# Patient Record
Sex: FEMALE | Race: BLACK OR AFRICAN AMERICAN | NOT HISPANIC OR LATINO | URBAN - METROPOLITAN AREA
[De-identification: names, ages, dates, MRNs, and addresses within clinical notes are randomized per-mention and may not be internally consistent; named-entity substitution may affect disease eponyms.]

---

## 2019-10-30 ENCOUNTER — APPOINTMENT (RX ONLY)
Dept: URBAN - METROPOLITAN AREA CLINIC 81 | Facility: CLINIC | Age: 64
Setting detail: DERMATOLOGY
End: 2019-10-30

## 2019-10-30 DIAGNOSIS — I73.00 RAYNAUD'S SYNDROME WITHOUT GANGRENE: ICD-10-CM

## 2019-10-30 DIAGNOSIS — D485 NEOPLASM OF UNCERTAIN BEHAVIOR OF SKIN: ICD-10-CM

## 2019-10-30 DIAGNOSIS — L93.0 DISCOID LUPUS ERYTHEMATOSUS: ICD-10-CM

## 2019-10-30 DIAGNOSIS — D49.2 NEOPLASM OF UNSPECIFIED BEHAVIOR OF BONE, SOFT TISSUE, AND SKIN: ICD-10-CM

## 2019-10-30 PROBLEM — D48.5 NEOPLASM OF UNCERTAIN BEHAVIOR OF SKIN: Status: ACTIVE | Noted: 2019-10-30

## 2019-10-30 PROBLEM — I48.91 UNSPECIFIED ATRIAL FIBRILLATION: Status: ACTIVE | Noted: 2019-10-30

## 2019-10-30 PROCEDURE — ? DEFER

## 2019-10-30 PROCEDURE — ? COUNSELING

## 2019-10-30 PROCEDURE — ? TREATMENT REGIMEN

## 2019-10-30 PROCEDURE — 99202 OFFICE O/P NEW SF 15 MIN: CPT

## 2019-10-30 ASSESSMENT — LOCATION DETAILED DESCRIPTION DERM
LOCATION DETAILED: RIGHT PROXIMAL DORSAL RING FINGER
LOCATION DETAILED: LEFT DISTAL PALMAR MIDDLE FINGER
LOCATION DETAILED: RIGHT DISTAL PALMAR INDEX FINGER
LOCATION DETAILED: RIGHT DISTAL PALMAR MIDDLE FINGER
LOCATION DETAILED: LEFT ANTERIOR SHOULDER
LOCATION DETAILED: RIGHT INFERIOR LATERAL FOREHEAD
LOCATION DETAILED: RIGHT DISTAL VENTRAL THUMB
LOCATION DETAILED: RIGHT MID-UPPER BACK

## 2019-10-30 ASSESSMENT — LOCATION SIMPLE DESCRIPTION DERM
LOCATION SIMPLE: RIGHT UPPER BACK
LOCATION SIMPLE: RIGHT MIDDLE FINGER
LOCATION SIMPLE: RIGHT FOREHEAD
LOCATION SIMPLE: RIGHT INDEX FINGER
LOCATION SIMPLE: RIGHT RING FINGER
LOCATION SIMPLE: RIGHT THUMB
LOCATION SIMPLE: LEFT SHOULDER
LOCATION SIMPLE: LEFT MIDDLE FINGER

## 2019-10-30 ASSESSMENT — LOCATION ZONE DERM
LOCATION ZONE: FACE
LOCATION ZONE: FINGER
LOCATION ZONE: ARM
LOCATION ZONE: TRUNK

## 2019-10-30 NOTE — PROCEDURE: TREATMENT REGIMEN
Plan: Lab slip for CBC, ESR, LUIS DANIEL, RF, UA, BUN, CREAT and VIT D given to pt today- depending on lab results and punch biopsy results will treat accordingly \\nPt to return to clinic after vacation for bx on the scalp\\n[facility]
Detail Level: Zone

## 2019-10-30 NOTE — PROCEDURE: MIPS QUALITY
Detail Level: Detailed
Quality 110: Preventive Care And Screening: Influenza Immunization: Influenza Immunization Administered during Influenza season
Quality 111:Pneumonia Vaccination Status For Older Adults: Pneumococcal Vaccination Previously Received
Quality 226: Preventive Care And Screening: Tobacco Use: Screening And Cessation Intervention: Patient screened for tobacco use and is an ex/non-smoker
Quality 130: Documentation Of Current Medications In The Medical Record: Current Medications Documented
Quality 131: Pain Assessment And Follow-Up: Pain assessment using a standardized tool is documented as negative, no follow-up plan required

## 2019-10-30 NOTE — PROCEDURE: DEFER
Detail Level: Detailed
Procedure To Be Performed At Next Visit: Punch Excision
Introduction Text (Please End With A Colon): lesions are not causing any symptoms

## 2020-12-08 ENCOUNTER — APPOINTMENT (RX ONLY)
Dept: URBAN - METROPOLITAN AREA CLINIC 81 | Facility: CLINIC | Age: 65
Setting detail: DERMATOLOGY
End: 2020-12-08

## 2020-12-08 DIAGNOSIS — D485 NEOPLASM OF UNCERTAIN BEHAVIOR OF SKIN: ICD-10-CM

## 2020-12-08 DIAGNOSIS — L93.0 DISCOID LUPUS ERYTHEMATOSUS: ICD-10-CM | Status: INADEQUATELY CONTROLLED

## 2020-12-08 PROBLEM — D48.5 NEOPLASM OF UNCERTAIN BEHAVIOR OF SKIN: Status: ACTIVE | Noted: 2020-12-08

## 2020-12-08 PROCEDURE — ? ORDER TESTS

## 2020-12-08 PROCEDURE — 11104 PUNCH BX SKIN SINGLE LESION: CPT

## 2020-12-08 PROCEDURE — ? TREATMENT REGIMEN

## 2020-12-08 PROCEDURE — 99213 OFFICE O/P EST LOW 20 MIN: CPT | Mod: 25

## 2020-12-08 PROCEDURE — ? BIOPSY BY PUNCH METHOD

## 2020-12-08 PROCEDURE — ? COUNSELING

## 2020-12-08 PROCEDURE — ? PRESCRIPTION

## 2020-12-08 RX ORDER — BETAMETHASONE DIPROPIONATE 0.5 MG/G
CREAM, AUGMENTED TOPICAL
Qty: 1 | Refills: 1 | Status: ERX

## 2020-12-08 RX ORDER — MOMETASONE FUROATE 1 MG/G
CREAM TOPICAL
Qty: 1 | Refills: 1 | Status: ERX

## 2020-12-08 ASSESSMENT — LOCATION DETAILED DESCRIPTION DERM
LOCATION DETAILED: LEFT INFERIOR PARIETAL SCALP
LOCATION DETAILED: LEFT INFERIOR FRONTAL SCALP
LOCATION DETAILED: RIGHT INFERIOR FRONTAL SCALP
LOCATION DETAILED: RIGHT SUPERIOR MEDIAL UPPER BACK

## 2020-12-08 ASSESSMENT — LOCATION SIMPLE DESCRIPTION DERM
LOCATION SIMPLE: SCALP
LOCATION SIMPLE: RIGHT UPPER BACK

## 2020-12-08 ASSESSMENT — LOCATION ZONE DERM
LOCATION ZONE: SCALP
LOCATION ZONE: TRUNK

## 2020-12-08 ASSESSMENT — SEVERITY ASSESSMENT: SEVERITY: MODERATE

## 2020-12-08 NOTE — PROCEDURE: ORDER TESTS
Billing Type: Client Bill
Bill For Surgical Tray: no
Performing Laboratory: [id number]
Expected Date Of Service: 12/08/2020

## 2020-12-08 NOTE — PROCEDURE: BIOPSY BY PUNCH METHOD
Detail Level: Detailed
Was A Bandage Applied: Yes
Punch Size In Mm: 4
Biopsy Type: H and E
Anesthesia Type: 1% lidocaine with epinephrine
Anesthesia Volume In Cc (Will Not Render If 0): 1
Additional Anesthesia Volume In Cc (Will Not Render If 0): 0
Hemostasis: None
Epidermal Sutures: 4-0 Prolene
Number Of Epidermal Sutures (Optional): 2
Wound Care: Aquaphor
Dressing: bandage
Suture Removal: 7 days
Patient Will Remove Sutures At Home?: No
Lab: [id number]
Lab Facility: [id number]
Consent: Written consent was obtained and risks were reviewed including but not limited to scarring, infection, bleeding, scabbing, incomplete removal, nerve damage and allergy to anesthesia.
Post-Care Instructions: I reviewed with the patient in detail post-care instructions. Patient is to keep the biopsy site dry overnight, and then apply bacitracin twice daily until healed. Patient may apply hydrogen peroxide soaks to remove any crusting.
Home Suture Removal Text: Patient was provided a home suture removal kit and will remove their sutures at home.  If they have any questions or difficulties they will call the office.
Notification Instructions: Patient will be notified of biopsy results. However, patient instructed to call the office if not contacted within 2 weeks.
Billing Type: Third-Party Bill
Information: Selecting Yes will display possible errors in your note based on the variables you have selected. This validation is only offered as a suggestion for you. PLEASE NOTE THAT THE VALIDATION TEXT WILL BE REMOVED WHEN YOU FINALIZE YOUR NOTE. IF YOU WANT TO FAX A PRELIMINARY NOTE YOU WILL NEED TO TOGGLE THIS TO 'NO' IF YOU DO NOT WANT IT IN YOUR FAXED NOTE.
Biopsy Type: DIF
Lab: [id number]
Lab Facility: [id number]
Home Suture Removal Text: Patient was provided a home suture removal kit and will remove their sutures at home.  If they have any questions or difficulties they will call the office.
Billing Type: Third-Party Bill

## 2020-12-08 NOTE — PROCEDURE: TREATMENT REGIMEN
Initiate Treatment: Plaquenil 200 mg daily, need Visual field exam and labs before we start\\n\\nAugmented Betamethasone cream bid to affected areas of back and scalp \\nMometasone cream bid to plaque under the breast
Detail Level: Zone
Plan: Lab request given to patient today. Will wait for labs and visual field exam before we send in Plaquenil

## 2020-12-15 ENCOUNTER — APPOINTMENT (RX ONLY)
Dept: URBAN - METROPOLITAN AREA CLINIC 81 | Facility: CLINIC | Age: 65
Setting detail: DERMATOLOGY
End: 2020-12-15

## 2020-12-15 DIAGNOSIS — M12.9 ARTHROPATHY, UNSPECIFIED: ICD-10-CM

## 2020-12-15 DIAGNOSIS — R79.89 OTHER SPECIFIED ABNORMAL FINDINGS OF BLOOD CHEMISTRY: ICD-10-CM

## 2020-12-15 DIAGNOSIS — L93.0 DISCOID LUPUS ERYTHEMATOSUS: ICD-10-CM

## 2020-12-15 DIAGNOSIS — Z48.02 ENCOUNTER FOR REMOVAL OF SUTURES: ICD-10-CM

## 2020-12-15 PROCEDURE — 99024 POSTOP FOLLOW-UP VISIT: CPT

## 2020-12-15 PROCEDURE — ? COUNSELING

## 2020-12-15 PROCEDURE — ? TREATMENT REGIMEN

## 2020-12-15 PROCEDURE — ? SUTURE REMOVAL (GLOBAL PERIOD)

## 2020-12-15 PROCEDURE — ? PATHOLOGY DISCUSSION

## 2020-12-15 PROCEDURE — ? LAB REPORTS REVIEWED

## 2020-12-15 PROCEDURE — 99213 OFFICE O/P EST LOW 20 MIN: CPT

## 2020-12-15 ASSESSMENT — LOCATION ZONE DERM
LOCATION ZONE: TRUNK
LOCATION ZONE: SCALP

## 2020-12-15 ASSESSMENT — LOCATION DETAILED DESCRIPTION DERM
LOCATION DETAILED: RIGHT INFERIOR FRONTAL SCALP
LOCATION DETAILED: RIGHT SUPERIOR MEDIAL UPPER BACK
LOCATION DETAILED: LEFT INFERIOR PARIETAL SCALP
LOCATION DETAILED: LEFT INFERIOR FRONTAL SCALP

## 2020-12-15 ASSESSMENT — LOCATION SIMPLE DESCRIPTION DERM
LOCATION SIMPLE: RIGHT UPPER BACK
LOCATION SIMPLE: SCALP

## 2020-12-15 ASSESSMENT — PAIN INTENSITY VAS: HOW INTENSE IS YOUR PAIN 0 BEING NO PAIN, 10 BEING THE MOST SEVERE PAIN POSSIBLE?: NO PAIN

## 2020-12-15 ASSESSMENT — SEVERITY ASSESSMENT: SEVERITY: MODERATE

## 2020-12-15 NOTE — PROCEDURE: TREATMENT REGIMEN
Continue Regimen: Augmented Betamethasone cream - apply to affected areas of back and scalp twice daily.\\nMometasone cream - apply twice daily to plaque under the breast.
Detail Level: Zone
Plan: Discussed treatment with dapsone and plaquenil. \\nPlan to start plaquenil once visual field exam is received. (Anticipated start dose : Plaquenil 200 mg daily). \\n\\nRefer to hematology (Dr. Laura Hernandez, Dr. Kei Vegas, or Dr. Rufus Florian). Patient will let us know which doctor she is going to see so we can send records.
Plan: Recommend patient see rheumatologist (Dr. Sher).

## 2020-12-15 NOTE — PROCEDURE: LAB REPORTS REVIEWED
Summarized Lab Results: WBC : 2.1 (low)\\nPlatelets : 126 (low)\\n\\nRecommend patient see hematology
Detail Level: Zone
Labs Reviewed Override: WBC and platelets

## 2020-12-15 NOTE — PROCEDURE: PATHOLOGY DISCUSSION
Add 75314 Cpt? (Important Note: In 2017 The Use Of 56329 Is Being Tracked By Cms To Determine Future Global Period Reimbursement For Global Periods): yes
Detail Level: Zone
Quality 265: Biopsy Follow-Up: Biopsy results reviewed, communicated, tracked, and documented

## 2020-12-15 NOTE — PROCEDURE: SUTURE REMOVAL (GLOBAL PERIOD)
Detail Level: Detailed
Add 82821 Cpt? (Important Note: In 2017 The Use Of 39617 Is Being Tracked By Cms To Determine Future Global Period Reimbursement For Global Periods): no

## 2021-01-25 ENCOUNTER — APPOINTMENT (RX ONLY)
Dept: URBAN - METROPOLITAN AREA CLINIC 81 | Facility: CLINIC | Age: 66
Setting detail: DERMATOLOGY
End: 2021-01-25

## 2021-01-25 DIAGNOSIS — M12.9 ARTHROPATHY, UNSPECIFIED: ICD-10-CM

## 2021-01-25 DIAGNOSIS — L65.9 NONSCARRING HAIR LOSS, UNSPECIFIED: ICD-10-CM

## 2021-01-25 DIAGNOSIS — L93.0 DISCOID LUPUS ERYTHEMATOSUS: ICD-10-CM

## 2021-01-25 DIAGNOSIS — R79.89 OTHER SPECIFIED ABNORMAL FINDINGS OF BLOOD CHEMISTRY: ICD-10-CM

## 2021-01-25 PROCEDURE — ? TREATMENT REGIMEN

## 2021-01-25 PROCEDURE — ? COUNSELING

## 2021-01-25 PROCEDURE — ? INTRALESIONAL KENALOG

## 2021-01-25 PROCEDURE — 99213 OFFICE O/P EST LOW 20 MIN: CPT | Mod: 25

## 2021-01-25 PROCEDURE — 11900 INJECT SKIN LESIONS </W 7: CPT

## 2021-01-25 ASSESSMENT — LOCATION DETAILED DESCRIPTION DERM
LOCATION DETAILED: RIGHT SUPERIOR MEDIAL UPPER BACK
LOCATION DETAILED: LEFT INFERIOR FRONTAL SCALP
LOCATION DETAILED: RIGHT INFERIOR LATERAL FOREHEAD
LOCATION DETAILED: LEFT CENTRAL FRONTAL SCALP
LOCATION DETAILED: RIGHT INFERIOR FRONTAL SCALP
LOCATION DETAILED: PELVIS
LOCATION DETAILED: LEFT INFERIOR PARIETAL SCALP

## 2021-01-25 ASSESSMENT — LOCATION ZONE DERM
LOCATION ZONE: SCALP
LOCATION ZONE: FACE
LOCATION ZONE: TRUNK
LOCATION ZONE: PELVIS

## 2021-01-25 ASSESSMENT — LOCATION SIMPLE DESCRIPTION DERM
LOCATION SIMPLE: PELVIS
LOCATION SIMPLE: RIGHT UPPER BACK
LOCATION SIMPLE: RIGHT FOREHEAD
LOCATION SIMPLE: SCALP

## 2021-01-25 NOTE — PROCEDURE: TREATMENT REGIMEN
Continue Regimen: Augmented Betamethasone cream - apply to affected areas of back and scalp twice daily.\\nMometasone cream - apply twice daily to plaque under the breast.
Detail Level: Zone
Plan: Discussed treatment with dapsone and plaquenil. \\nPlan to start plaquenil once visual field exam is received. (Anticipated start dose : Plaquenil 200 mg daily). \\nShe will see Felisha Payton MD in rheumatology for evaluation--she has yet to schedule an appt.\\n\\nRefer to hematology she is going to see Dr. Laura Hernandez in March.
Plan: Recommend patient see rheumatologist (Dr. Sher).
Plan: Recommend seeing Rheumatologist

## 2021-01-25 NOTE — PROCEDURE: INTRALESIONAL KENALOG
Kenalog Preparation: Kenalog in bacteriostatic water
Include Z78.9 (Other Specified Conditions Influencing Health Status) As An Associated Diagnosis?: No
Detail Level: Detailed
Lot # (Optional): [id number]
Consent: The risks of atrophy were reviewed with the patient.
X Size Of Lesion In Cm (Optional): 0
Expiration Date (Optional): 3/22
Medical Necessity Clause: This procedure was medically necessary because the lesions that were treated were:
Concentration Of Solution Injected (Mg/Ml): 5.0
Total Volume Injected (Ccs- Only Use Numbers And Decimals): 1 cc

## 2021-07-07 ENCOUNTER — RX ONLY (OUTPATIENT)
Age: 66
Setting detail: RX ONLY
End: 2021-07-07

## 2021-07-07 RX ORDER — MOMETASONE FUROATE 1 MG/G
CREAM TOPICAL
Qty: 1 | Refills: 0 | Status: ERX

## 2022-08-17 ENCOUNTER — APPOINTMENT (RX ONLY)
Dept: URBAN - METROPOLITAN AREA CLINIC 81 | Facility: CLINIC | Age: 67
Setting detail: DERMATOLOGY
End: 2022-08-17

## 2022-08-17 DIAGNOSIS — L94.2 CALCINOSIS CUTIS: ICD-10-CM

## 2022-08-17 DIAGNOSIS — L81.4 OTHER MELANIN HYPERPIGMENTATION: ICD-10-CM

## 2022-08-17 DIAGNOSIS — L93.0 DISCOID LUPUS ERYTHEMATOSUS: ICD-10-CM

## 2022-08-17 PROBLEM — D48.5 NEOPLASM OF UNCERTAIN BEHAVIOR OF SKIN: Status: ACTIVE | Noted: 2022-08-17

## 2022-08-17 PROCEDURE — ? RECORDS REQUESTED

## 2022-08-17 PROCEDURE — ? TREATMENT REGIMEN

## 2022-08-17 PROCEDURE — ? COUNSELING

## 2022-08-17 PROCEDURE — ? PRESCRIPTION MEDICATION MANAGEMENT

## 2022-08-17 PROCEDURE — 99214 OFFICE O/P EST MOD 30 MIN: CPT

## 2022-08-17 PROCEDURE — ? ORDER TESTS

## 2022-08-17 PROCEDURE — ? DIAGNOSIS COMMENT

## 2022-08-17 PROCEDURE — ? PRESCRIPTION

## 2022-08-17 RX ORDER — TRIAMCINOLONE ACETONIDE 1 MG/G
CREAM TOPICAL BID
Qty: 45 | Refills: 3 | Status: ERX

## 2022-08-17 ASSESSMENT — LOCATION DETAILED DESCRIPTION DERM
LOCATION DETAILED: LEFT RADIAL DORSAL HAND
LOCATION DETAILED: RIGHT RADIAL DORSAL HAND
LOCATION DETAILED: RIGHT RING FINGERTIP
LOCATION DETAILED: LEFT RADIAL DORSAL HAND

## 2022-08-17 ASSESSMENT — LOCATION ZONE DERM
LOCATION ZONE: FINGER
LOCATION ZONE: HAND
LOCATION ZONE: HAND

## 2022-08-17 ASSESSMENT — LOCATION SIMPLE DESCRIPTION DERM
LOCATION SIMPLE: LEFT HAND
LOCATION SIMPLE: RIGHT HAND
LOCATION SIMPLE: LEFT HAND
LOCATION SIMPLE: RIGHT RING FINGER

## 2022-08-17 NOTE — PROCEDURE: DIAGNOSIS COMMENT
Render Risk Assessment In Note?: no
Comment: on palmar surface.  No gingival hyperpigmentation
Detail Level: Simple

## 2022-08-17 NOTE — PROCEDURE: PRESCRIPTION MEDICATION MANAGEMENT
Render In Strict Bullet Format?: No
Initiate Treatment: triamcinolone acetonide 0.1 % topical cream : Apply twice daily to rash on hands for up to two weeks. Take one week break. Repeat PRN flares.
Detail Level: Zone

## 2022-08-17 NOTE — PROCEDURE: MIPS QUALITY
Detail Level: Detailed
Quality 226: Preventive Care And Screening: Tobacco Use: Screening And Cessation Intervention: Patient screened for tobacco use and is an ex/non-smoker
Quality 110: Preventive Care And Screening: Influenza Immunization: Influenza Immunization previously received during influenza season
Quality 130: Documentation Of Current Medications In The Medical Record: Current Medications Documented
Quality 431: Preventive Care And Screening: Unhealthy Alcohol Use - Screening: Patient not identified as an unhealthy alcohol user when screened for unhealthy alcohol use using a systematic screening method
Quality 111:Pneumonia Vaccination Status For Older Adults: Pneumococcal vaccine administered on or after patient’s 60th birthday and before the end of the measurement period

## 2022-08-17 NOTE — HPI: SUBCUTANEOUS GROWTH
How Severe Is Your Growth?: moderate
Is This A New Presentation, Or A Follow-Up?: Subcutaneous Growths
Additional History: Pt states the dry skin on the top of her left hand is itchy, tender, and rough. She states she has been peeling the skin off.

## 2022-08-17 NOTE — PROCEDURE: ORDER TESTS
Performing Laboratory: 0
Expected Date Of Service: 08/17/2022
Clinical Notes (To The Lab): Labs to be drawn in the morning, before 9:00 AM.
Billing Type: Client Bill
Bill For Surgical Tray: no

## 2022-08-17 NOTE — PROCEDURE: RECORDS REQUESTED
Providers To Request Records From: Dr. Pagan (PCP), Dr. Manrique (cardiologist), Dr. Massey (cardiologist)
Detail Level: Simple
Preface: I requested the records from the following providers.

## 2022-10-07 ENCOUNTER — APPOINTMENT (RX ONLY)
Dept: URBAN - METROPOLITAN AREA CLINIC 81 | Facility: CLINIC | Age: 67
Setting detail: DERMATOLOGY
End: 2022-10-07

## 2022-10-07 DIAGNOSIS — L93.0 DISCOID LUPUS ERYTHEMATOSUS: ICD-10-CM

## 2022-10-07 DIAGNOSIS — L08.9 LOCAL INFECTION OF THE SKIN AND SUBCUTANEOUS TISSUE, UNSPECIFIED: ICD-10-CM

## 2022-10-07 PROCEDURE — ? PRESCRIPTION MEDICATION MANAGEMENT

## 2022-10-07 PROCEDURE — 99213 OFFICE O/P EST LOW 20 MIN: CPT

## 2022-10-07 PROCEDURE — ? ADDITIONAL NOTES

## 2022-10-07 PROCEDURE — ? PRESCRIPTION

## 2022-10-07 PROCEDURE — ? COUNSELING

## 2022-10-07 RX ORDER — DOXYCYCLINE HYCLATE 100 MG/1
CAPSULE, GELATIN COATED ORAL BID
Qty: 20 | Refills: 0 | Status: ERX

## 2022-10-07 ASSESSMENT — LOCATION DETAILED DESCRIPTION DERM
LOCATION DETAILED: RIGHT DISTAL ULNAR PALMAR RING FINGER
LOCATION DETAILED: LEFT PROXIMAL DORSAL MIDDLE FINGER
LOCATION DETAILED: LEFT RADIAL DORSAL HAND
LOCATION DETAILED: RIGHT RADIAL DORSAL HAND

## 2022-10-07 ASSESSMENT — LOCATION ZONE DERM
LOCATION ZONE: FINGER
LOCATION ZONE: HAND

## 2022-10-07 ASSESSMENT — LOCATION SIMPLE DESCRIPTION DERM
LOCATION SIMPLE: RIGHT HAND
LOCATION SIMPLE: LEFT MIDDLE FINGER
LOCATION SIMPLE: LEFT HAND
LOCATION SIMPLE: RIGHT RING FINGER

## 2022-10-07 NOTE — PROCEDURE: ADDITIONAL NOTES
Detail Level: Simple
Additional Notes: Pt tried triamcinolone to hands that made her hands worse.  She declined stronger steroid cream as triamcinolone made her skin feel worse. Her areas on hands feel warm and tender.  Will give Abx and follow up next week.  Pt understands and agrees with plan.
Render Risk Assessment In Note?: no
Additional Notes: Her labs that we reviewed were from her PCP.  No lab results from orders given in August 2022.  Will obtain lab work from lab.

## 2022-10-07 NOTE — PROCEDURE: PRESCRIPTION MEDICATION MANAGEMENT
Render In Strict Bullet Format?: No
Detail Level: Zone
Continue Regimen: Continue Tylenol
Initiate Treatment: doxycycline hyclate 100 mg capsules\\nSig: Take one capsule twice daily for 10 days

## 2022-10-14 ENCOUNTER — APPOINTMENT (RX ONLY)
Dept: URBAN - METROPOLITAN AREA CLINIC 81 | Facility: CLINIC | Age: 67
Setting detail: DERMATOLOGY
End: 2022-10-14

## 2022-10-14 DIAGNOSIS — D485 NEOPLASM OF UNCERTAIN BEHAVIOR OF SKIN: ICD-10-CM

## 2022-10-14 DIAGNOSIS — L20.89 OTHER ATOPIC DERMATITIS: ICD-10-CM | Status: INADEQUATELY CONTROLLED

## 2022-10-14 DIAGNOSIS — L30.3 INFECTIVE DERMATITIS: ICD-10-CM

## 2022-10-14 PROBLEM — D48.5 NEOPLASM OF UNCERTAIN BEHAVIOR OF SKIN: Status: ACTIVE | Noted: 2022-10-14

## 2022-10-14 PROCEDURE — ? PRESCRIPTION MEDICATION MANAGEMENT

## 2022-10-14 PROCEDURE — ? DIAGNOSIS COMMENT

## 2022-10-14 PROCEDURE — ? PRESCRIPTION

## 2022-10-14 PROCEDURE — 99214 OFFICE O/P EST MOD 30 MIN: CPT

## 2022-10-14 PROCEDURE — ? COUNSELING

## 2022-10-14 PROCEDURE — ? ORDER TESTS

## 2022-10-14 RX ORDER — TACROLIMUS 1 MG/G
OINTMENT TOPICAL BID
Qty: 60 | Refills: 3 | Status: ERX | COMMUNITY
Start: 2022-10-14

## 2022-10-14 RX ADMIN — TACROLIMUS: 1 OINTMENT TOPICAL at 00:00

## 2022-10-14 ASSESSMENT — LOCATION DETAILED DESCRIPTION DERM
LOCATION DETAILED: RIGHT RING FINGERTIP
LOCATION DETAILED: LEFT DORSAL INDEX FINGER METACARPOPHALANGEAL JOINT
LOCATION DETAILED: LEFT DORSAL INDEX FINGER METACARPOPHALANGEAL JOINT
LOCATION DETAILED: RIGHT RING FINGERTIP

## 2022-10-14 ASSESSMENT — LOCATION SIMPLE DESCRIPTION DERM
LOCATION SIMPLE: RIGHT RING FINGER
LOCATION SIMPLE: LEFT HAND
LOCATION SIMPLE: RIGHT RING FINGER
LOCATION SIMPLE: LEFT HAND

## 2022-10-14 ASSESSMENT — LOCATION ZONE DERM
LOCATION ZONE: HAND
LOCATION ZONE: FINGER
LOCATION ZONE: HAND
LOCATION ZONE: FINGER

## 2022-10-14 ASSESSMENT — SEVERITY ASSESSMENT 2020: SEVERITY 2020: MODERATE

## 2022-10-14 ASSESSMENT — BSA ECZEMA: % BODY COVERED IN ECZEMA: 3

## 2022-10-14 NOTE — PROCEDURE: DIAGNOSIS COMMENT
Comment: This lesion is stable. 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  Will defer skin biopsy or calcinosis cutis ( even though hand xray was negative.)  If lesion grows will re-consider skin biopsy
Detail Level: Simple
Render Risk Assessment In Note?: no

## 2022-10-14 NOTE — PROCEDURE: PRESCRIPTION MEDICATION MANAGEMENT
Detail Level: Zone
Render In Strict Bullet Format?: No
Plan: Patient to call office and be worked in if hands flare.
Initiate Treatment: Tacrolimus ointment - apply to affected area of hands twice daily as needed.

## 2022-10-14 NOTE — PROCEDURE: ORDER TESTS
Billing Type: Client Bill
Expected Date Of Service: 10/14/2022
Bill For Surgical Tray: no
Performing Laboratory: 0